# Patient Record
Sex: MALE | Race: WHITE | NOT HISPANIC OR LATINO | URBAN - METROPOLITAN AREA
[De-identification: names, ages, dates, MRNs, and addresses within clinical notes are randomized per-mention and may not be internally consistent; named-entity substitution may affect disease eponyms.]

---

## 2019-12-01 ENCOUNTER — EMERGENCY (EMERGENCY)
Facility: HOSPITAL | Age: 59
LOS: 0 days | Discharge: HOME | End: 2019-12-01
Admitting: EMERGENCY MEDICINE
Payer: SUBSIDIZED

## 2019-12-01 VITALS
HEIGHT: 72.05 IN | WEIGHT: 198.42 LBS | TEMPERATURE: 96 F | SYSTOLIC BLOOD PRESSURE: 130 MMHG | DIASTOLIC BLOOD PRESSURE: 88 MMHG | OXYGEN SATURATION: 99 % | HEART RATE: 63 BPM | RESPIRATION RATE: 18 BRPM

## 2019-12-01 DIAGNOSIS — H92.02 OTALGIA, LEFT EAR: ICD-10-CM

## 2019-12-01 DIAGNOSIS — H92.09 OTALGIA, UNSPECIFIED EAR: ICD-10-CM

## 2019-12-01 DIAGNOSIS — H66.92 OTITIS MEDIA, UNSPECIFIED, LEFT EAR: ICD-10-CM

## 2019-12-01 PROCEDURE — 99282 EMERGENCY DEPT VISIT SF MDM: CPT

## 2019-12-01 RX ORDER — AMOXICILLIN 250 MG/5ML
1 SUSPENSION, RECONSTITUTED, ORAL (ML) ORAL
Qty: 14 | Refills: 0
Start: 2019-12-01 | End: 2019-12-07

## 2019-12-01 NOTE — ED PROVIDER NOTE - CLINICAL SUMMARY MEDICAL DECISION MAKING FREE TEXT BOX
59 male with L ear pain x24 hours, recent flight from [place]. afebrile, denies HA or neck pain. Physical exam reveals erythematous and bulging L TM with pain of pinna palpation. Pt diagnosed with AOM will be discharged on amoxicillin.

## 2019-12-01 NOTE — ED PROVIDER NOTE - OBJECTIVE STATEMENT
Pt is a 59 year old male with no PMH, recent travel today, landed from [place], presents to ED with L ear pain. Pt states during flight last night noticed discomfort to L ear, which grew progressively worse. Pt states pain is mild-moderate with no alleviating or aggravating factors. Pain is non radiating. Pt denies any fever, cough chills, HA. Denies any otic discharge. Denies chest pain, SOB or abdominal pain

## 2019-12-01 NOTE — ED PROVIDER NOTE - PATIENT PORTAL LINK FT
You can access the FollowMyHealth Patient Portal offered by [facility] by registering at the following website: [email or web address]. By joining [facility]’s FollowMyHealth portal, you will also be able to view your health information using other applications (apps) compatible with our system.

## 2019-12-01 NOTE — ED PROVIDER NOTE - NSFOLLOWUPINSTRUCTIONS_ED_ALL_ED_FT
Follow up with your Primary Medical Doctor when you return to [place], or return to Emergency department or Urgent care for worsening symptoms.    ****[facility] pharmacy: [address]***  ****While at [facility] purchase OTC Pseudoephedrine for flight****    Ear Infection in Children    WHAT YOU NEED TO KNOW:    An ear infection is also called otitis media. Your child may have an ear infection in one or both ears. Your child may get an ear infection when his or her eustachian tubes become swollen or blocked. Eustachian tubes drain fluid away from the middle ear. Your child may have a buildup of fluid and pressure in his or her ear when he or she has an ear infection. The ear may become infected by germs. The germs grow easily in fluid trapped behind the eardrum.Ear Anatomy         DISCHARGE INSTRUCTIONS:    Seek care immediately if:     You see blood or pus draining from your child's ear.      Your child seems confused or cannot stay awake.      Your child has a stiff neck, headache, and a fever.    Contact your child's healthcare provider if:     Your child has a fever.      Your child is still not eating or drinking 24 hours after he or she takes medicine.      Your child has pain behind his or her ear or when you move the earlobe.      Your child's ear is sticking out from his or her head.      Your child still has signs and symptoms of an ear infection 48 hours after he or she takes medicine.      You have questions or concerns about your child's condition or care.    Medicines:     Medicines may be given to decrease your child's pain or fever, or to treat an infection caused by bacteria.       Do not give aspirin to children under 18 years of age. Your child could develop Reye syndrome if he takes aspirin. Reye syndrome can cause life-threatening brain and liver damage. Check your child's medicine labels for aspirin, salicylates, or oil of wintergreen.       Give your child's medicine as directed. Contact your child's healthcare provider if you think the medicine is not working as expected. Tell him or her if your child is allergic to any medicine. Keep a current list of the medicines, vitamins, and herbs your child takes. Include the amounts, and when, how, and why they are taken. Bring the list or the medicines in their containers to follow-up visits. Carry your child's medicine list with you in case of an emergency.    Care for your child at home:     Prop your older child's head and chest up while he or she sleeps. This may decrease ear pressure and pain. Ask your child's healthcare provider how to safely prop your child's head and chest up.      Have your child lie with his or her infected ear facing down to allow fluid to drain from the ear.       Use ice or heat to help decrease your child's ear pain. Ask which of these is best for your child, and use as directed.      Ask about ways to keep water out of your child's ears when he or she bathes or swims.     Prevent an ear infection:     Wash your and your child's hands often to help prevent the spread of germs. Ask everyone in your house to wash their hands with soap and water. Ask them to wash after they use the bathroom or change a diaper. Remind them to wash before they prepare or eat food.Handwashing           Keep your child away from people who are ill, such as sick playmates. Germs spread easily and quickly in  centers.       If possible, breastfeed your baby. Your baby may be less likely to get an ear infection if he or she is .      Do not give your child a bottle while he or she is lying down. This may cause liquid from the sinuses to leak into his or her eustachian tube.      Keep your child away from people who smoke.       Vaccinate your child. Ask your child's healthcare provider about the shots your child needs.    Follow up with your child's healthcare provider as directed: Write down your questions so you remember to ask them during your child's visits.

## 2019-12-01 NOTE — ED PROVIDER NOTE - PHYSICAL EXAMINATION
Physical Exam    Vital Signs: I have reviewed the initial vital signs.  Constitutional: well-nourished, appears stated age, no acute distress  Eyes: Conjunctiva pink, Sclera clear, PERRLA, EOMI.  Ears: R ear TM is non bulging, no erythema. L TM is bulging, erythematous. No pain with palpation or erythema of mastoid. Mild pain with palpation of pinna.   Musculoskeletal: supple neck, no lower extremity edema, no midline tenderness  Integumentary: warm, dry, no rash  Neurologic: awake, alert, cranial nerves II-XII grossly intact, extremities’ motor and sensory functions grossly intact  Psychiatric: appropriate mood, appropriate affect

## 2019-12-01 NOTE — ED PROVIDER NOTE - NS ED ROS FT
Constitutional: (-) fever  Eyes/ENT: (-) blurry vision, (-) epistaxis, (+) L ear ache  Cardiovascular: (-) chest pain, (-) syncope  Respiratory: (-) cough, (-) shortness of breath  Gastrointestinal: (-) vomiting, (-) diarrhea  Musculoskeletal: (-) neck pain, (-) back pain, (-) joint pain  Integumentary: (-) rash, (-) edema  Neurological: (-) headache, (-) altered mental status  Psychiatric: (-) hallucinations  Allergic/Immunologic: (-) pruritus